# Patient Record
Sex: FEMALE | Race: OTHER | NOT HISPANIC OR LATINO | URBAN - METROPOLITAN AREA
[De-identification: names, ages, dates, MRNs, and addresses within clinical notes are randomized per-mention and may not be internally consistent; named-entity substitution may affect disease eponyms.]

---

## 2024-11-10 ENCOUNTER — EMERGENCY (EMERGENCY)
Facility: HOSPITAL | Age: 19
LOS: 1 days | Discharge: HOME | End: 2024-11-10
Admitting: EMERGENCY MEDICINE
Payer: COMMERCIAL

## 2024-11-10 VITALS
HEART RATE: 81 BPM | TEMPERATURE: 98 F | SYSTOLIC BLOOD PRESSURE: 116 MMHG | WEIGHT: 117.07 LBS | DIASTOLIC BLOOD PRESSURE: 76 MMHG | RESPIRATION RATE: 16 BRPM | HEIGHT: 64 IN

## 2024-11-10 VITALS
TEMPERATURE: 98 F | DIASTOLIC BLOOD PRESSURE: 82 MMHG | OXYGEN SATURATION: 99 % | RESPIRATION RATE: 18 BRPM | SYSTOLIC BLOOD PRESSURE: 122 MMHG | HEART RATE: 80 BPM
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
## 2024-11-10 DIAGNOSIS — M25.572 PAIN IN LEFT ANKLE AND JOINTS OF LEFT FOOT: ICD-10-CM
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
## 2024-11-10 PROCEDURE — 73610 X-RAY EXAM OF ANKLE: CPT | Mod: 26,LT

## 2024-11-10 PROCEDURE — 99284 EMERGENCY DEPT VISIT MOD MDM: CPT

## 2024-11-10 RX ORDER — ACETAMINOPHEN 500 MG
975 TABLET ORAL ONCE
Refills: 0 | Status: COMPLETED | OUTPATIENT
Start: 2024-11-10 | End: 2024-11-10

## 2024-11-10 RX ADMIN — Medication 975 MILLIGRAM(S): at 17:04
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
## 2024-11-10 NOTE — ED PROVIDER NOTE - PHYSICAL EXAMINATION
VITAL SIGNS: I have reviewed nursing notes and confirm.  CONSTITUTIONAL: Well-developed; well-nourished; in no acute distress.  SKIN: Skin is warm and dry, no acute rash.  HEENT: Normocephalic; atraumatic. No facial or scalp hematomas or ttp.   NECK: Supple; non tender. FROM.  CARD: S1, S2 normal; no murmurs, gallops, or rubs. Regular rate and rhythm.  RESP: No wheezes, rales or rhonchi.  ABD: Soft; non-distended; non-tender to deep palpation in pelvis or all 4 quadrants; no rebound or guarding.  EXT: Normal ROM. No clubbing, cyanosis or edema.  NEURO: Alert, oriented. Grossly unremarkable. ACEVEDO, normal tone, no gross motor or sensory changes. Fluent speech.   PSYCH: Cooperative, appropriate. Mood and affect wnl.

## 2024-11-10 NOTE — ED PROVIDER NOTE - PATIENT PORTAL LINK FT
You can access the FollowMyHealth Patient Portal offered by [facility] by registering at the following website: [email or web address]. By joining [employer]’s FollowMyHealth portal, you will also be able to view your health information using other applications (apps) compatible with our system.

## 2024-11-10 NOTE — ED ADULT NURSE NOTE - NSFALLUNIVINTERV_ED_ALL_ED
Bed/Stretcher in lowest position, wheels locked, appropriate side rails in place/Call bell, personal items and telephone in reach/Instruct patient to call for assistance before getting out of bed/chair/stretcher/Non-slip footwear applied when patient is off stretcher/[place] to call system/Physically safe environment - no spills, clutter or unnecessary equipment/Purposeful proactive rounding/Room/bathroom lighting operational, light cord in reach
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
